# Patient Record
Sex: FEMALE | Race: OTHER | Employment: UNEMPLOYED | URBAN - NONMETROPOLITAN AREA
[De-identification: names, ages, dates, MRNs, and addresses within clinical notes are randomized per-mention and may not be internally consistent; named-entity substitution may affect disease eponyms.]

---

## 2019-12-26 ENCOUNTER — HOSPITAL ENCOUNTER (EMERGENCY)
Age: 34
Discharge: HOME | End: 2019-12-26
Payer: MEDICARE

## 2019-12-26 VITALS
DIASTOLIC BLOOD PRESSURE: 81 MMHG | OXYGEN SATURATION: 100 % | HEART RATE: 82 BPM | RESPIRATION RATE: 18 BRPM | TEMPERATURE: 97 F | SYSTOLIC BLOOD PRESSURE: 120 MMHG

## 2019-12-26 DIAGNOSIS — L02.612 ABSCESS OF LEFT FOOT: Primary | ICD-10-CM

## 2019-12-26 PROCEDURE — 87070 CULTURE OTHR SPECIMN AEROBIC: CPT

## 2019-12-26 PROCEDURE — 87205 SMEAR GRAM STAIN: CPT

## 2019-12-26 PROCEDURE — 87186 SC STD MICRODIL/AGAR DIL: CPT

## 2019-12-26 PROCEDURE — 99283 EMERGENCY DEPT VISIT LOW MDM: CPT

## 2019-12-26 PROCEDURE — 86403 PARTICLE AGGLUT ANTBDY SCRN: CPT

## 2019-12-26 RX ORDER — CLINDAMYCIN HYDROCHLORIDE 300 MG/1
300 CAPSULE ORAL 3 TIMES DAILY
Qty: 21 CAPSULE | Refills: 0 | Status: SHIPPED | OUTPATIENT
Start: 2019-12-26 | End: 2020-01-02

## 2019-12-26 RX ORDER — LIDOCAINE HYDROCHLORIDE 10 MG/ML
20 INJECTION, SOLUTION INFILTRATION; PERINEURAL ONCE
Status: DISCONTINUED | OUTPATIENT
Start: 2019-12-26 | End: 2019-12-26 | Stop reason: HOSPADM

## 2019-12-26 ASSESSMENT — PAIN DESCRIPTION - ORIENTATION: ORIENTATION: LEFT

## 2019-12-26 ASSESSMENT — PAIN DESCRIPTION - LOCATION: LOCATION: FOOT

## 2019-12-26 ASSESSMENT — PAIN SCALES - GENERAL: PAINLEVEL_OUTOF10: 4

## 2019-12-29 LAB
CULTURE: ABNORMAL
DIRECT EXAM: ABNORMAL
DIRECT EXAM: ABNORMAL
Lab: ABNORMAL
SPECIMEN DESCRIPTION: ABNORMAL

## 2020-09-15 ENCOUNTER — TELEPHONE (OUTPATIENT)
Dept: OBGYN CLINIC | Age: 35
End: 2020-09-15

## 2020-09-15 NOTE — TELEPHONE ENCOUNTER
Patient had labs done 9/11/2020 at [facility]. Please call her with the following information:    Appears to have iron deficiency anemia with low vitamin b12 - should consider seeing heme for consult and we can facilitate this. Her insulin level is improved, A1C slightly higher than previous year - see PCP and forward results for this. Can supplement the B12 in our office if she desires - let me know and I can send script for in office wkly injections. Lab results are on your desk to scan.   Thanks

## 2020-09-21 NOTE — TELEPHONE ENCOUNTER
Called patient and reviewed results. She is agreeable to heme referral. PCP is Sharon Haq so we can forward results. She is also agreeable to the B12 injections, need to send to pharmacy and call her back for appts for that.

## 2020-09-22 RX ORDER — CYANOCOBALAMIN 1000 UG/ML
1000 INJECTION INTRAMUSCULAR; SUBCUTANEOUS WEEKLY
Qty: 4 ML | Refills: 1 | Status: SHIPPED | OUTPATIENT
Start: 2020-09-22 | End: 2020-11-18

## 2020-09-23 NOTE — TELEPHONE ENCOUNTER
Called patient and let her know referral was sent and confirmation was received. Rx at her pharmacy and made all 8 appts for her injections in the office.

## 2020-09-28 ENCOUNTER — NURSE ONLY (OUTPATIENT)
Dept: OBGYN CLINIC | Age: 35
End: 2020-09-28
Payer: MEDICARE

## 2020-09-28 PROCEDURE — 96372 THER/PROPH/DIAG INJ SC/IM: CPT | Performed by: ADVANCED PRACTICE MIDWIFE

## 2020-09-28 RX ORDER — CYANOCOBALAMIN 1000 UG/ML
1000 INJECTION INTRAMUSCULAR; SUBCUTANEOUS ONCE
Status: COMPLETED | OUTPATIENT
Start: 2020-09-28 | End: 2020-09-28

## 2020-09-28 RX ADMIN — CYANOCOBALAMIN 1000 MCG: 1000 INJECTION INTRAMUSCULAR; SUBCUTANEOUS at 15:11

## 2020-10-05 ENCOUNTER — NURSE ONLY (OUTPATIENT)
Dept: OBGYN CLINIC | Age: 35
End: 2020-10-05
Payer: MEDICARE

## 2020-10-05 PROCEDURE — 96372 THER/PROPH/DIAG INJ SC/IM: CPT | Performed by: ADVANCED PRACTICE MIDWIFE

## 2020-10-05 RX ORDER — CYANOCOBALAMIN 1000 UG/ML
1000 INJECTION INTRAMUSCULAR; SUBCUTANEOUS ONCE
Status: COMPLETED | OUTPATIENT
Start: 2020-10-05 | End: 2020-10-05

## 2020-10-05 RX ADMIN — CYANOCOBALAMIN 1000 MCG: 1000 INJECTION INTRAMUSCULAR; SUBCUTANEOUS at 15:04

## 2020-10-12 RX ORDER — TIZANIDINE 2 MG/1
2 TABLET ORAL EVERY 6 HOURS PRN
COMMUNITY

## 2020-10-12 RX ORDER — OMEPRAZOLE 10 MG/1
10 CAPSULE, DELAYED RELEASE ORAL DAILY
COMMUNITY

## 2020-10-12 RX ORDER — IBUPROFEN AND FAMOTIDINE 26.6; 8 MG/1; MG/1
TABLET, FILM COATED ORAL
COMMUNITY

## 2020-10-12 RX ORDER — FINGOLIMOD HCL 0.25 MG/1
CAPSULE ORAL
COMMUNITY

## 2020-10-12 RX ORDER — ERGOCALCIFEROL 1.25 MG/1
50000 CAPSULE ORAL WEEKLY
COMMUNITY

## 2020-10-12 RX ORDER — OXYCODONE AND ACETAMINOPHEN 10; 325 MG/1; MG/1
1 TABLET ORAL EVERY 4 HOURS PRN
COMMUNITY

## 2020-10-12 RX ORDER — DOCUSATE SODIUM 100 MG/1
100 CAPSULE, LIQUID FILLED ORAL 2 TIMES DAILY
COMMUNITY

## 2020-10-12 SDOH — HEALTH STABILITY: MENTAL HEALTH: HOW OFTEN DO YOU HAVE A DRINK CONTAINING ALCOHOL?: NEVER

## 2020-10-15 ENCOUNTER — NURSE ONLY (OUTPATIENT)
Dept: OBGYN CLINIC | Age: 35
End: 2020-10-15
Payer: MEDICARE

## 2020-10-15 PROCEDURE — 96372 THER/PROPH/DIAG INJ SC/IM: CPT | Performed by: ADVANCED PRACTICE MIDWIFE

## 2020-10-15 PROCEDURE — 99211 OFF/OP EST MAY X REQ PHY/QHP: CPT | Performed by: OBSTETRICS & GYNECOLOGY

## 2020-10-15 RX ORDER — CYANOCOBALAMIN 1000 UG/ML
1000 INJECTION INTRAMUSCULAR; SUBCUTANEOUS ONCE
Status: COMPLETED | OUTPATIENT
Start: 2020-10-15 | End: 2020-10-15

## 2020-10-15 RX ADMIN — CYANOCOBALAMIN 1000 MCG: 1000 INJECTION INTRAMUSCULAR; SUBCUTANEOUS at 15:28

## 2020-10-19 ENCOUNTER — NURSE ONLY (OUTPATIENT)
Dept: OBGYN CLINIC | Age: 35
End: 2020-10-19
Payer: MEDICARE

## 2020-10-19 PROCEDURE — 96372 THER/PROPH/DIAG INJ SC/IM: CPT | Performed by: ADVANCED PRACTICE MIDWIFE

## 2020-10-19 RX ORDER — CYANOCOBALAMIN 1000 UG/ML
1000 INJECTION INTRAMUSCULAR; SUBCUTANEOUS ONCE
Status: COMPLETED | OUTPATIENT
Start: 2020-10-19 | End: 2020-10-19

## 2020-10-19 RX ADMIN — CYANOCOBALAMIN 1000 MCG: 1000 INJECTION INTRAMUSCULAR; SUBCUTANEOUS at 15:11

## 2020-10-27 ENCOUNTER — NURSE ONLY (OUTPATIENT)
Dept: OBGYN CLINIC | Age: 35
End: 2020-10-27
Payer: MEDICARE

## 2020-10-27 PROCEDURE — 96372 THER/PROPH/DIAG INJ SC/IM: CPT | Performed by: ADVANCED PRACTICE MIDWIFE

## 2020-10-27 RX ORDER — CYANOCOBALAMIN 1000 UG/ML
1000 INJECTION INTRAMUSCULAR; SUBCUTANEOUS ONCE
Status: COMPLETED | OUTPATIENT
Start: 2020-10-27 | End: 2020-10-27

## 2020-10-27 RX ADMIN — CYANOCOBALAMIN 1000 MCG: 1000 INJECTION INTRAMUSCULAR; SUBCUTANEOUS at 14:54

## 2020-11-02 ENCOUNTER — NURSE ONLY (OUTPATIENT)
Dept: OBGYN CLINIC | Age: 35
End: 2020-11-02
Payer: MEDICARE

## 2020-11-02 PROCEDURE — 96372 THER/PROPH/DIAG INJ SC/IM: CPT | Performed by: ADVANCED PRACTICE MIDWIFE

## 2020-11-02 PROCEDURE — 99211 OFF/OP EST MAY X REQ PHY/QHP: CPT | Performed by: ADVANCED PRACTICE MIDWIFE

## 2020-11-02 RX ORDER — CYANOCOBALAMIN 1000 UG/ML
1000 INJECTION INTRAMUSCULAR; SUBCUTANEOUS ONCE
Status: COMPLETED | OUTPATIENT
Start: 2020-11-02 | End: 2020-11-02

## 2020-11-02 RX ADMIN — CYANOCOBALAMIN 1000 MCG: 1000 INJECTION INTRAMUSCULAR; SUBCUTANEOUS at 15:34

## 2020-11-09 ENCOUNTER — NURSE ONLY (OUTPATIENT)
Dept: OBGYN CLINIC | Age: 35
End: 2020-11-09
Payer: MEDICARE

## 2020-11-09 PROCEDURE — 99211 OFF/OP EST MAY X REQ PHY/QHP: CPT | Performed by: OBSTETRICS & GYNECOLOGY

## 2020-11-09 PROCEDURE — 96372 THER/PROPH/DIAG INJ SC/IM: CPT | Performed by: ADVANCED PRACTICE MIDWIFE

## 2020-11-09 RX ORDER — CYANOCOBALAMIN 1000 UG/ML
1000 INJECTION INTRAMUSCULAR; SUBCUTANEOUS ONCE
Status: COMPLETED | OUTPATIENT
Start: 2020-11-09 | End: 2020-11-09

## 2020-11-09 RX ADMIN — CYANOCOBALAMIN 1000 MCG: 1000 INJECTION INTRAMUSCULAR; SUBCUTANEOUS at 14:58

## 2020-11-16 ENCOUNTER — NURSE ONLY (OUTPATIENT)
Dept: OBGYN CLINIC | Age: 35
End: 2020-11-16
Payer: MEDICARE

## 2020-11-16 PROCEDURE — 96372 THER/PROPH/DIAG INJ SC/IM: CPT | Performed by: ADVANCED PRACTICE MIDWIFE

## 2020-11-16 RX ORDER — CYANOCOBALAMIN 1000 UG/ML
1000 INJECTION INTRAMUSCULAR; SUBCUTANEOUS ONCE
Status: COMPLETED | OUTPATIENT
Start: 2020-11-16 | End: 2020-11-16

## 2020-11-16 RX ADMIN — CYANOCOBALAMIN 1000 MCG: 1000 INJECTION INTRAMUSCULAR; SUBCUTANEOUS at 14:55

## 2020-11-18 RX ORDER — CYANOCOBALAMIN 1000 UG/ML
INJECTION INTRAMUSCULAR; SUBCUTANEOUS
Qty: 4 ML | Refills: 1 | Status: SHIPPED | OUTPATIENT
Start: 2020-11-18 | End: 2021-11-04 | Stop reason: ALTCHOICE

## 2021-10-27 ENCOUNTER — TELEPHONE (OUTPATIENT)
Dept: OBGYN CLINIC | Age: 36
End: 2021-10-27

## 2021-10-27 NOTE — TELEPHONE ENCOUNTER
Received automated refill request for patient metformin from [facility] - overdue for an annual     Has an appt scheduled for 11/4/21    Please call her and see if would like this Metformin refilled at this time - if so okay to send a one month supply - per their records taking 1000mg daily

## 2021-11-04 ENCOUNTER — HOSPITAL ENCOUNTER (OUTPATIENT)
Age: 36
Setting detail: SPECIMEN
Discharge: HOME | End: 2021-11-04
Payer: MEDICARE

## 2021-11-04 ENCOUNTER — OFFICE VISIT (OUTPATIENT)
Dept: OBGYN CLINIC | Age: 36
End: 2021-11-04
Payer: MEDICARE

## 2021-11-04 VITALS
BODY MASS INDEX: 33.36 KG/M2 | SYSTOLIC BLOOD PRESSURE: 110 MMHG | HEIGHT: 64 IN | WEIGHT: 195.4 LBS | DIASTOLIC BLOOD PRESSURE: 60 MMHG

## 2021-11-04 DIAGNOSIS — E74.818 OTHER DISORDERS OF GLUCOSE TRANSPORT (HCC): ICD-10-CM

## 2021-11-04 DIAGNOSIS — Z12.72 SMEAR, VAGINAL, AS PART OF ROUTINE GYNECOLOGICAL EXAMINATION: Primary | ICD-10-CM

## 2021-11-04 DIAGNOSIS — E88.81 INSULIN RESISTANCE: ICD-10-CM

## 2021-11-04 DIAGNOSIS — Z01.419 SMEAR, VAGINAL, AS PART OF ROUTINE GYNECOLOGICAL EXAMINATION: Primary | ICD-10-CM

## 2021-11-04 LAB
ALBUMIN SERPL-MCNC: 4.7 G/DL (ref 3.5–5.2)
ALBUMIN/GLOBULIN RATIO: 1.7 (ref 1–2.5)
ALP BLD-CCNC: 74 U/L (ref 35–104)
ALT SERPL-CCNC: 18 U/L (ref 5–33)
ANION GAP SERPL CALCULATED.3IONS-SCNC: 13 MMOL/L (ref 9–17)
AST SERPL-CCNC: 19 U/L
BILIRUB SERPL-MCNC: 0.26 MG/DL (ref 0.3–1.2)
BUN BLDV-MCNC: 8 MG/DL (ref 6–20)
BUN/CREAT BLD: ABNORMAL (ref 9–20)
CALCIUM SERPL-MCNC: 9.6 MG/DL (ref 8.6–10.4)
CHLORIDE BLD-SCNC: 103 MMOL/L (ref 98–107)
CO2: 22 MMOL/L (ref 20–31)
CREAT SERPL-MCNC: 0.78 MG/DL (ref 0.5–0.9)
ESTIMATED AVERAGE GLUCOSE: 91 MG/DL
GFR AFRICAN AMERICAN: >60 ML/MIN
GFR NON-AFRICAN AMERICAN: >60 ML/MIN
GFR SERPL CREATININE-BSD FRML MDRD: ABNORMAL ML/MIN/{1.73_M2}
GFR SERPL CREATININE-BSD FRML MDRD: ABNORMAL ML/MIN/{1.73_M2}
GLUCOSE BLD-MCNC: 88 MG/DL (ref 70–99)
HBA1C MFR BLD: 4.8 % (ref 4–6)
POTASSIUM SERPL-SCNC: 4.7 MMOL/L (ref 3.7–5.3)
SODIUM BLD-SCNC: 138 MMOL/L (ref 135–144)
TOTAL PROTEIN: 7.5 G/DL (ref 6.4–8.3)
VITAMIN B-12: 288 PG/ML (ref 232–1245)

## 2021-11-04 PROCEDURE — G8417 CALC BMI ABV UP PARAM F/U: HCPCS | Performed by: ADVANCED PRACTICE MIDWIFE

## 2021-11-04 PROCEDURE — G0101 CA SCREEN;PELVIC/BREAST EXAM: HCPCS | Performed by: ADVANCED PRACTICE MIDWIFE

## 2021-11-04 PROCEDURE — G8427 DOCREV CUR MEDS BY ELIG CLIN: HCPCS | Performed by: ADVANCED PRACTICE MIDWIFE

## 2021-11-04 PROCEDURE — 36415 COLL VENOUS BLD VENIPUNCTURE: CPT | Performed by: ADVANCED PRACTICE MIDWIFE

## 2021-11-04 RX ORDER — LORATADINE 10 MG/1
10 CAPSULE, LIQUID FILLED ORAL
COMMUNITY

## 2021-11-04 RX ORDER — TRAZODONE HYDROCHLORIDE 100 MG/1
TABLET ORAL
COMMUNITY
Start: 2021-09-18

## 2021-11-04 RX ORDER — ONDANSETRON 4 MG/1
4 TABLET, FILM COATED ORAL EVERY 8 HOURS PRN
COMMUNITY

## 2021-11-04 RX ORDER — CLOBETASOL PROPIONATE 0.5 MG/G
OINTMENT TOPICAL
COMMUNITY
Start: 2021-10-06

## 2021-11-04 RX ORDER — METHYLPHENIDATE HYDROCHLORIDE 10 MG/1
TABLET ORAL
COMMUNITY
Start: 2021-10-04

## 2021-11-04 ASSESSMENT — ENCOUNTER SYMPTOMS
DIARRHEA: 0
ABDOMINAL PAIN: 0
SHORTNESS OF BREATH: 0
GASTROINTESTINAL NEGATIVE: 1
RESPIRATORY NEGATIVE: 1
CONSTIPATION: 0

## 2021-11-04 NOTE — PROGRESS NOTES
YEARLY PHYSICAL    Date of service: 2021    Cheryl Flores  Is a 29 y.o.   female    PT's PCP is: Kirstie Bird MD     : 1985                                             Subjective:       Patient's last menstrual period was 10/20/2021 (exact date). Are your menses regular: yes    OB History    Para Term  AB Living   2 2 2     2   SAB TAB Ectopic Molar Multiple Live Births             2      # Outcome Date GA Lbr Siddhartha/2nd Weight Sex Delivery Anes PTL Lv   2 Term 18 40w0d  8 lb 8 oz (3.856 kg) F  EPI  HUNG   1 Term 10/17/10 40w0d  8 lb 6 oz (3.799 kg) M Vag-Spont EPI N HUNG        Social History     Tobacco Use   Smoking Status Never Smoker   Smokeless Tobacco Never Used        Social History     Substance and Sexual Activity   Alcohol Use Never       Family History   Problem Relation Age of Onset    Stomach Cancer Maternal Grandmother     Diabetes Maternal Grandmother     Hypertension Mother     Ovarian Cancer Maternal Aunt 29       Any family history of breast or ovarian cancer: Yes    Any family history of blood clots: No      Allergies: Patient has no known allergies.       Current Outpatient Medications:     ALBUTEROL IN, Inhale into the lungs, Disp: , Rfl:     traZODone (DESYREL) 100 MG tablet, take 1 tablet by mouth at bedtime, Disp: , Rfl:     ondansetron (ZOFRAN) 4 MG tablet, Take 4 mg by mouth every 8 hours as needed, Disp: , Rfl:     methylphenidate (RITALIN) 10 MG tablet, take 1 tablet by mouth twice a day, Disp: , Rfl:     loratadine (CLARITIN) 10 MG capsule, Take 10 tablets by mouth, Disp: , Rfl:     clobetasol (TEMOVATE) 0.05 % ointment, apply topically to affected area twice a day for 2 weeks then if needed for itching / SCALE, Disp: , Rfl:     metFORMIN (GLUCOPHAGE) 500 MG tablet, Take 1 tablet by mouth 2 times daily (with meals), Disp: 60 tablet, Rfl: 0    tiZANidine (ZANAFLEX) 2 MG tablet, Take 2 mg by mouth every 6 hours as needed, Disp: , Rfl:     Fingolimod HCl (GILENYA) 0.25 MG CAPS, Take by mouth, Disp: , Rfl:     oxyCODONE-acetaminophen (PERCOCET)  MG per tablet, Take 1 tablet by mouth every 4 hours as needed for Pain., Disp: , Rfl:     omeprazole (PRILOSEC) 10 MG delayed release capsule, Take 10 mg by mouth daily, Disp: , Rfl:     docusate sodium (COLACE) 100 MG capsule, Take 100 mg by mouth 2 times daily, Disp: , Rfl:     vitamin D (ERGOCALCIFEROL) 1.25 MG (89182 UT) CAPS capsule, Take 50,000 Units by mouth once a week, Disp: , Rfl:     ibuprofen-famotidine 800-26.6 MG TABS, Take by mouth, Disp: , Rfl:     Social History     Substance and Sexual Activity   Sexual Activity Yes    Partners: Male       Any bleeding or pain with intercourse: No    Last Yearly:  2020    Last pap: 2019 - normal    Last HPV: 2019 - negative    Do you do self breast exams: Encouraged    Past Medical History:   Diagnosis Date    Anxiety     Asthma     Chlamydia     Chronic back pain     Depression     Insulin resistance     Multiple sclerosis ([facility] [address]     Muscular dystrophy ([facility] [address]     Polycystic ovary        Past Surgical History:   Procedure Laterality Date    STERILIZATION  2018       Family History   Problem Relation Age of Onset    Stomach Cancer Maternal Grandmother     Diabetes Maternal Grandmother     Hypertension Mother     Ovarian Cancer Maternal Aunt 29       Chief Complaint   Patient presents with    Annual Exam     Annual exam. Pap NL 4/4/19. Pt would like refill of metformin. Denies breast/pelvic pain. Denies concerns. Labs:    No results found for this visit on 11/04/21. HPI: Denies breast/pelvic concerns. Menses regular. Pap normal 2019. Desires refill of Metformin. Declines STI screening    Review of Systems   Constitutional: Negative. Negative for chills, fatigue and fever. HENT: Negative. Respiratory: Negative. Negative for shortness of breath. Cardiovascular: Negative. Negative for chest pain. Gastrointestinal: Negative. Negative for abdominal pain, constipation and diarrhea. Genitourinary: Negative for dysuria, enuresis, frequency, menstrual problem, pelvic pain, urgency and vaginal bleeding. Musculoskeletal: Negative. Neurological: Negative. Negative for dizziness, light-headedness and headaches. Psychiatric/Behavioral: Negative. Objective  Blood pressure 110/60, height 5' 4\" (1.626 m), weight 195 lb 6.4 oz (88.6 kg), last menstrual period 10/20/2021. Physical Exam  Constitutional:       Appearance: Normal appearance. She is normal weight. Genitourinary:      Pelvic exam was performed with patient in the lithotomy position. Vulva, urethra, vagina, cervix, uterus, right adnexa and left adnexa normal.      Uterus is anteverted and regular. HENT:      Head: Normocephalic. Eyes:      Pupils: Pupils are equal, round, and reactive to light. Cardiovascular:      Rate and Rhythm: Normal rate and regular rhythm. Pulses: Normal pulses. Heart sounds: Normal heart sounds. No murmur heard. Pulmonary:      Effort: Pulmonary effort is normal.      Breath sounds: Normal breath sounds. No wheezing. Chest:      Breasts:         Right: Normal.         Left: Normal.   Abdominal:      Palpations: Abdomen is soft. Tenderness: There is no abdominal tenderness. Musculoskeletal:         General: Normal range of motion. Cervical back: Normal range of motion. No muscular tenderness. Neurological:      Mental Status: She is alert and oriented to person, place, and time. Skin:     General: Skin is warm and dry. Psychiatric:         Behavior: Behavior normal.   Vitals and nursing note reviewed. Chaperone present: Declined. Assessment and Plan          Diagnosis Orders   1. Smear, vaginal, as part of routine gynecological examination     2.  Insulin resistance  Comprehensive Metabolic Panel    Hemoglobin A1C   3. Other disorders of glucose transport ([facility] [address]   Hemoglobin A1C        Repeat Annual every 1 year  Cervical Cytology Evaluation begins at 24years old. If Negative Cytology, Follow-up screening per current guidelines. Mammograms every 1year. If 37 yo and last mammogram was negative. Calcium and Vitamin D dosing reviewed. Colonoscopy screening reviewed as well as onset for bone density testing. Birth control and barrier recommendationsdiscussed. STD counseling and prevention reviewed. Gardisil counseling completed for all patients. Routine healthmaintenance per patients PCP. Will obtain CMP and A1C today in addition to repeat vit b check - will send in metformin - taking 1000mg BID      I have discontinued Anna Wiseman's cyanocobalamin. I am also having her maintain her tiZANidine, Gilenya, oxyCODONE-acetaminophen, omeprazole, docusate sodium, vitamin D, ibuprofen-famotidine, metFORMIN, traZODone, ondansetron, methylphenidate, loratadine, clobetasol, and ALBUTEROL IN. Return in about 1 year (around 11/4/2022) for Yearly. She was also counseled on her preventative health maintenance recommendations and follow-up. There are no Patient Instructions on file for this visit.     AMOL Frey CNM,11/4/2021 11:17 AM

## 2022-10-14 NOTE — PROGRESS NOTES
Chaperone for Intimate Exam   Chaperone was offered as part of the rooming process. Patient declined and agrees to continue with exam without a chaperone. Vaccine Information Statement(s) or the Emergency Use Authorization was given today. This has been reviewed, questions answered, and verbal consent given by Patient for injection(s) and administration of Influenza (Inactivated).      Patient tolerated without incident. See immunization grid for documentation.

## 2022-10-31 ENCOUNTER — TELEPHONE (OUTPATIENT)
Dept: OBGYN CLINIC | Age: 37
End: 2022-10-31

## 2022-10-31 DIAGNOSIS — N92.6 IRREGULAR MENSES: Primary | ICD-10-CM

## 2022-10-31 NOTE — TELEPHONE ENCOUNTER
Pt was scheduled for a procedure at [facility] through another physician, however a UPT was done and it was positive then 2 more negative. Pt did have a TL with Dr Jacob Feng. They cancelled her procedure until a SPT is ordered with a negative result. She would like to do that today at her primary physician office if possible.  Please advise

## 2022-11-02 DIAGNOSIS — N92.6 IRREGULAR MENSES: ICD-10-CM

## 2022-11-02 NOTE — TELEPHONE ENCOUNTER
Results of SPT lab sent to Dr. Ranjit Trejo, the physician who cxl'd procedure at [facility]. Confirmation rec'd.

## 2022-11-21 NOTE — TELEPHONE ENCOUNTER
Patient left a message on the voicemail that she was needing a refill of her metformin. I attempted to call her back and had to leave a message. Explained that we responded to the electronic request from her pharmacy and refills were sent in. Patient to call with any further questions/concerns.

## 2023-01-17 ENCOUNTER — HOSPITAL ENCOUNTER (OUTPATIENT)
Age: 38
Setting detail: SPECIMEN
Discharge: HOME | End: 2023-01-17

## 2023-01-17 ENCOUNTER — OFFICE VISIT (OUTPATIENT)
Dept: OBGYN CLINIC | Age: 38
End: 2023-01-17
Payer: MEDICARE

## 2023-01-17 VITALS
HEIGHT: 64 IN | WEIGHT: 196.2 LBS | SYSTOLIC BLOOD PRESSURE: 110 MMHG | DIASTOLIC BLOOD PRESSURE: 70 MMHG | BODY MASS INDEX: 33.49 KG/M2

## 2023-01-17 DIAGNOSIS — Z12.4 SCREENING FOR CERVICAL CANCER: ICD-10-CM

## 2023-01-17 DIAGNOSIS — E88.81 METABOLIC SYNDROME: ICD-10-CM

## 2023-01-17 DIAGNOSIS — Z01.419 SMEAR, VAGINAL, AS PART OF ROUTINE GYNECOLOGICAL EXAMINATION: Primary | ICD-10-CM

## 2023-01-17 DIAGNOSIS — Z12.72 SMEAR, VAGINAL, AS PART OF ROUTINE GYNECOLOGICAL EXAMINATION: Primary | ICD-10-CM

## 2023-01-17 LAB
ALBUMIN SERPL-MCNC: 4 G/DL (ref 3.5–5.2)
ALBUMIN/GLOBULIN RATIO: 1.8 (ref 1–2.5)
ALP BLD-CCNC: 65 U/L (ref 35–104)
ALT SERPL-CCNC: 18 U/L (ref 5–33)
ANION GAP SERPL CALCULATED.3IONS-SCNC: 12 MMOL/L (ref 9–17)
AST SERPL-CCNC: 20 U/L
BILIRUB SERPL-MCNC: 0.3 MG/DL (ref 0.3–1.2)
BUN BLDV-MCNC: 9 MG/DL (ref 6–20)
CALCIUM SERPL-MCNC: 8.9 MG/DL (ref 8.6–10.4)
CHLORIDE BLD-SCNC: 101 MMOL/L (ref 98–107)
CO2: 21 MMOL/L (ref 20–31)
CREAT SERPL-MCNC: 0.71 MG/DL (ref 0.5–0.9)
GFR SERPL CREATININE-BSD FRML MDRD: >60 ML/MIN/1.73M2
GLUCOSE BLD-MCNC: 94 MG/DL (ref 70–99)
POTASSIUM SERPL-SCNC: 4.5 MMOL/L (ref 3.7–5.3)
SODIUM BLD-SCNC: 134 MMOL/L (ref 135–144)
TOTAL PROTEIN: 6.2 G/DL (ref 6.4–8.3)

## 2023-01-17 PROCEDURE — 36415 COLL VENOUS BLD VENIPUNCTURE: CPT | Performed by: ADVANCED PRACTICE MIDWIFE

## 2023-01-17 PROCEDURE — G0101 CA SCREEN;PELVIC/BREAST EXAM: HCPCS | Performed by: ADVANCED PRACTICE MIDWIFE

## 2023-01-17 RX ORDER — HALOBETASOL PROPIONATE 0.05 %
OINTMENT (GRAM) TOPICAL
COMMUNITY
Start: 2022-12-16

## 2023-01-17 RX ORDER — TIZANIDINE 4 MG/1
TABLET ORAL
COMMUNITY
Start: 2023-01-09

## 2023-01-17 RX ORDER — FINGOLIMOD HYDROCHLORIDE 0.5 MG/1
CAPSULE ORAL
COMMUNITY
Start: 2022-12-23

## 2023-01-17 RX ORDER — OMEPRAZOLE 40 MG/1
CAPSULE, DELAYED RELEASE ORAL
COMMUNITY
Start: 2022-11-14 | End: 2023-01-17 | Stop reason: ALTCHOICE

## 2023-01-17 RX ORDER — IPRATROPIUM BROMIDE 42 UG/1
SPRAY, METERED NASAL
COMMUNITY
Start: 2022-11-14

## 2023-01-17 RX ORDER — CHOLECALCIFEROL (VITAMIN D3) 50 MCG
TABLET ORAL
COMMUNITY
Start: 2023-01-04

## 2023-01-17 RX ORDER — ONDANSETRON HYDROCHLORIDE 8 MG/1
TABLET, FILM COATED ORAL
COMMUNITY
Start: 2022-12-30

## 2023-01-17 ASSESSMENT — ENCOUNTER SYMPTOMS
RESPIRATORY NEGATIVE: 1
SHORTNESS OF BREATH: 0
DIARRHEA: 0
CONSTIPATION: 0
GASTROINTESTINAL NEGATIVE: 1
ABDOMINAL PAIN: 0

## 2023-01-17 NOTE — PROGRESS NOTES
YEARLY PHYSICAL    Date of service: 2023    Anaya Salmeron  Is a 40 y.o.   female    PT's PCP is: Jerry Vaca MD     : 1985                                             Subjective:       Patient's last menstrual period was 2023 (exact date). Are your menses regular: yes    OB History    Para Term  AB Living   2 2 2     2   SAB IAB Ectopic Molar Multiple Live Births             2      # Outcome Date GA Lbr Siddhartha/2nd Weight Sex Delivery Anes PTL Lv   2 Term 18 40w0d  8 lb 8 oz (3.856 kg) F  EPI  HUNG   1 Term 10/17/10 40w0d  8 lb 6 oz (3.799 kg) M Vag-Spont EPI N HUNG        Social History     Tobacco Use   Smoking Status Never   Smokeless Tobacco Never        Social History     Substance and Sexual Activity   Alcohol Use Never       Family History   Problem Relation Age of Onset    Stomach Cancer Maternal Grandmother     Diabetes Maternal Grandmother     Hypertension Mother     Ovarian Cancer Maternal Aunt 58 [facility]       Any family history of breast or ovarian cancer: Yes    Any family history of blood clots: No      Allergies: Patient has no known allergies. Current Outpatient Medications:     Fingolimod HCl 0.5 MG CAPS, , Disp: , Rfl:     tiZANidine (ZANAFLEX) 4 MG tablet, take 1 tablet by mouth three times a day, Disp: , Rfl:     ondansetron (ZOFRAN) 8 MG tablet, take 1 tablet by mouth three times a day if needed for nausea, Disp: , Rfl:     omeprazole (PRILOSEC) 40 MG delayed release capsule, take 1 capsule by mouth once daily, Disp: , Rfl:     ipratropium (ATROVENT) 0.06 % nasal spray, instill 2 sprays into each nostril four times a day if needed for. ..  (REFER TO PRESCRIPTION NOTES). , Disp: , Rfl:     halobetasol (ULTRAVATE) 0.05 % ointment, apply A THIN LAYER topically to affected area twice a day AND COVER, Disp: , Rfl:     vitamin D (CHOLECALCIFEROL) 50 MCG (2000) TABS tablet, take 1 tablet by mouth once daily, Disp: , Rfl:     metFORMIN (GLUCOPHAGE) 1000 MG tablet, take 1 tablet by mouth twice a day with meals, Disp: 60 tablet, Rfl: 0    traZODone (DESYREL) 100 MG tablet, take 1 tablet by mouth at bedtime, Disp: , Rfl:     ondansetron (ZOFRAN) 4 MG tablet, Take 4 mg by mouth every 8 hours as needed, Disp: , Rfl:     methylphenidate (RITALIN) 10 MG tablet, take 1 tablet by mouth twice a day, Disp: , Rfl:     loratadine (CLARITIN) 10 MG capsule, Take 10 tablets by mouth, Disp: , Rfl:     clobetasol (TEMOVATE) 0.05 % ointment, apply topically to affected area twice a day for 2 weeks then if needed for itching / SCALE, Disp: , Rfl:     ALBUTEROL IN, Inhale into the lungs, Disp: , Rfl:     tiZANidine (ZANAFLEX) 2 MG tablet, Take 2 mg by mouth every 6 hours as needed, Disp: , Rfl:     oxyCODONE-acetaminophen (PERCOCET)  MG per tablet, Take 1 tablet by mouth every 4 hours as needed for Pain., Disp: , Rfl:     omeprazole (PRILOSEC) 10 MG delayed release capsule, Take 10 mg by mouth daily, Disp: , Rfl:     docusate sodium (COLACE) 100 MG capsule, Take 100 mg by mouth 2 times daily, Disp: , Rfl:     vitamin D (ERGOCALCIFEROL) 1.25 MG (50463 UT) CAPS capsule, Take 50,000 Units by mouth once a week, Disp: , Rfl:     ibuprofen-famotidine 800-26.6 MG TABS, Take by mouth, Disp: , Rfl:     Social History     Substance and Sexual Activity   Sexual Activity Yes    Partners: Male       Any bleeding or pain with intercourse: No    Last Yearly:  2021    Last pap: 2019 - normal    Last HPV: 2019 - negative    Do you do self breast exams: Encouraged    Past Medical History:   Diagnosis Date    Anxiety     Asthma     Chlamydia     Chronic back pain     Depression     Insulin resistance     Multiple sclerosis ([facility] [address]     Muscular dystrophy ([facility] [address]     Polycystic ovary        Past Surgical History:   Procedure Laterality Date    STERILIZATION  2018       Family History   Problem Relation Age of Onset    Stomach Cancer Maternal Grandmother     Diabetes Maternal Grandmother     Hypertension Mother     Ovarian Cancer Maternal Aunt 29       Chief Complaint   Patient presents with    Annual Exam     Pap due. Pt denies concenrs. Desires refill of metformin. Labs:    No results found for this visit on 01/17/23. HPI:  Annual.  Denies breast/pelvic concerns. Due for pap smear. D eclines Sti screening. Desires refill of metformin. Reports fatigue - neuro recently started Ritalin for this - has f/u scheduled \"just not helping much yet\". Review of Systems   Constitutional:  Positive for fatigue. Negative for chills and fever. HENT: Negative. Respiratory: Negative. Negative for shortness of breath. Cardiovascular: Negative. Negative for chest pain. Gastrointestinal: Negative. Negative for abdominal pain, constipation and diarrhea. Genitourinary:  Negative for dysuria, enuresis, frequency, menstrual problem, pelvic pain, urgency and vaginal bleeding. Musculoskeletal: Negative. Neurological: Negative. Negative for dizziness, light-headedness and headaches. Psychiatric/Behavioral: Negative. Objective  Blood pressure 110/70, height 5' 4\" (1.626 m), weight 196 lb 3.2 oz (89 kg), last menstrual period 01/04/2023. Physical Exam  Constitutional:       Appearance: Normal appearance. She is obese. Genitourinary:      Vulva, bladder and urethral meatus normal.      No vaginal discharge or tenderness. No vaginal prolapse present. Right Adnexa: not tender. Left Adnexa: not tender. No cervical motion tenderness or discharge. Uterus is not tender. Pelvic exam was performed with patient in the lithotomy position. Breasts:     Breasts are symmetrical.      Breasts are soft. Right: No mass, nipple discharge, skin change or tenderness. Left: No mass, nipple discharge, skin change or tenderness. HENT:      Head: Normocephalic.    Eyes:      Pupils: Pupils are equal, round, and reactive to light. Cardiovascular:      Rate and Rhythm: Normal rate and regular rhythm. Pulses: Normal pulses. Heart sounds: Normal heart sounds. No murmur heard. Pulmonary:      Effort: Pulmonary effort is normal.      Breath sounds: Normal breath sounds. No wheezing. Abdominal:      Palpations: Abdomen is soft. Tenderness: There is no abdominal tenderness. Musculoskeletal:         General: Normal range of motion. Cervical back: Normal range of motion. No muscular tenderness. Neurological:      Mental Status: She is alert and oriented to person, place, and time. Skin:     General: Skin is warm and dry. Psychiatric:         Behavior: Behavior normal.   Vitals and nursing note reviewed. Chaperone present: Declined. Assessment and Plan          Diagnosis Orders   1. Smear, vaginal, as part of routine gynecological examination        2. Screening for cervical cancer  PAP SMEAR          Repeat Annual every 1 year  Cervical Cytology Evaluation begins at 24years old. If Negative Cytology, Follow-up screening per current guidelines. Mammograms every 1year. If 37 yo and last mammogram was negative. Calcium and Vitamin D dosing reviewed. Birth control and barrier recommendationsdiscussed. STD counseling and prevention reviewed. Routine healthmaintenance per patients PCP. CMP today for refill of metformin        I am having Anna Wiseman maintain her tiZANidine, oxyCODONE-acetaminophen, omeprazole, docusate sodium, vitamin D, ibuprofen-famotidine, traZODone, ondansetron, methylphenidate, loratadine, clobetasol, ALBUTEROL IN, Fingolimod HCl, tiZANidine, ondansetron, omeprazole, ipratropium, halobetasol, vitamin D, and metFORMIN. Return in about 1 year (around 1/17/2024) for Med Follow Up, Yearly. She was also counseled on her preventative health maintenance recommendations and follow-up.      There are no Patient Instructions on file for this visit.    Hasmukh Castrejon, AMOL - JAMARI,1/17/2023 11:39 AM

## 2023-01-20 LAB
HPV SAMPLE: NORMAL
HPV, GENOTYPE 16: NOT DETECTED
HPV, GENOTYPE 18: NOT DETECTED
HPV, HIGH RISK OTHER: NOT DETECTED
HPV, INTERPRETATION: NORMAL
SPECIMEN DESCRIPTION: NORMAL

## 2023-02-13 ENCOUNTER — OFFICE VISIT (OUTPATIENT)
Dept: OBGYN CLINIC | Age: 38
End: 2023-02-13
Payer: MEDICARE

## 2023-02-13 VITALS — WEIGHT: 200 LBS | SYSTOLIC BLOOD PRESSURE: 108 MMHG | DIASTOLIC BLOOD PRESSURE: 78 MMHG | BODY MASS INDEX: 34.33 KG/M2

## 2023-02-13 DIAGNOSIS — N76.0 ABSCESS, VAGINA: Primary | ICD-10-CM

## 2023-02-13 PROCEDURE — 99214 OFFICE O/P EST MOD 30 MIN: CPT | Performed by: ADVANCED PRACTICE MIDWIFE

## 2023-02-13 PROCEDURE — G8484 FLU IMMUNIZE NO ADMIN: HCPCS | Performed by: ADVANCED PRACTICE MIDWIFE

## 2023-02-13 PROCEDURE — G8427 DOCREV CUR MEDS BY ELIG CLIN: HCPCS | Performed by: ADVANCED PRACTICE MIDWIFE

## 2023-02-13 PROCEDURE — 1036F TOBACCO NON-USER: CPT | Performed by: ADVANCED PRACTICE MIDWIFE

## 2023-02-13 PROCEDURE — G8417 CALC BMI ABV UP PARAM F/U: HCPCS | Performed by: ADVANCED PRACTICE MIDWIFE

## 2023-02-13 RX ORDER — CEPHALEXIN 500 MG/1
500 CAPSULE ORAL 2 TIMES DAILY
Qty: 14 CAPSULE | Refills: 0 | Status: SHIPPED | OUTPATIENT
Start: 2023-02-13 | End: 2023-02-20

## 2023-02-13 RX ORDER — IBUPROFEN 800 MG/1
TABLET ORAL
COMMUNITY
Start: 2023-02-01

## 2023-02-13 ASSESSMENT — ENCOUNTER SYMPTOMS
GASTROINTESTINAL NEGATIVE: 1
RESPIRATORY NEGATIVE: 1

## 2023-02-13 NOTE — PROGRESS NOTES
PROBLEM VISIT     Date of service: 2023    Mariela Chase  Is a 40 y.o.  female    PT's PCP is: Walter Baker MD     : 1985                                          HPI Here for evaluation of vaginal lesion. Reports last week had area on right side that was painful and Thursday \"the worst\" - size was larger, painful. Was taking warm showers and over weekend area did begin to leak clear/white fluid followed by blood tinged fluid. Reports that now area is still sore but quite a bit smaller than was previously. Review of Systems   Constitutional: Negative. HENT: Negative. Respiratory: Negative. Gastrointestinal: Negative. Genitourinary:  Positive for genital sores. Negative for menstrual problem. Neurological: Negative. Psychiatric/Behavioral: Negative. Patient's last menstrual period was 2023 (exact date). OB History    Para Term  AB Living   2 2 2     2   SAB IAB Ectopic Molar Multiple Live Births             2      # Outcome Date GA Lbr Siddhartha/2nd Weight Sex Delivery Anes PTL Lv   2 Term 18 40w0d  8 lb 8 oz (3.856 kg) F  EPI  HUNG   1 Term 10/17/10 40w0d  8 lb 6 oz (3.799 kg) M Vag-Spont EPI N HUNG        Social History     Tobacco Use   Smoking Status Never   Smokeless Tobacco Never        Social History     Substance and Sexual Activity   Alcohol Use Never       Allergies: Patient has no known allergies.       Current Outpatient Medications:     ibuprofen (ADVIL;MOTRIN) 800 MG tablet, take 1 tablet by mouth three times a day AS NEEDED FOR PAIN, Disp: , Rfl:     cephALEXin (KEFLEX) 500 MG capsule, Take 1 capsule by mouth 2 times daily for 7 days, Disp: 14 capsule, Rfl: 0    Fingolimod HCl 0.5 MG CAPS, , Disp: , Rfl:     tiZANidine (ZANAFLEX) 4 MG tablet, take 1 tablet by mouth three times a day, Disp: , Rfl:     ondansetron (ZOFRAN) 8 MG tablet, take 1 tablet by mouth three times a day if needed for nausea, Disp: , Rfl:     ipratropium (ATROVENT) 0.06 % nasal spray, instill 2 sprays into each nostril four times a day if needed for. ..  (REFER TO PRESCRIPTION NOTES). , Disp: , Rfl:     halobetasol (ULTRAVATE) 0.05 % ointment, apply A THIN LAYER topically to affected area twice a day AND COVER, Disp: , Rfl:     vitamin D (CHOLECALCIFEROL) 50 MCG (2000 UT) TABS tablet, take 1 tablet by mouth once daily, Disp: , Rfl:     metFORMIN (GLUCOPHAGE) 1000 MG tablet, take 1 tablet by mouth twice a day with meals, Disp: 60 tablet, Rfl: 12    traZODone (DESYREL) 100 MG tablet, take 1 tablet by mouth at bedtime, Disp: , Rfl:     methylphenidate (RITALIN) 10 MG tablet, take 1 tablet by mouth twice a day, Disp: , Rfl:     loratadine (CLARITIN) 10 MG capsule, Take 10 tablets by mouth, Disp: , Rfl:     clobetasol (TEMOVATE) 0.05 % ointment, apply topically to affected area twice a day for 2 weeks then if needed for itching / SCALE, Disp: , Rfl:     ALBUTEROL IN, Inhale into the lungs, Disp: , Rfl:     oxyCODONE-acetaminophen (PERCOCET)  MG per tablet, Take 1 tablet by mouth every 4 hours as needed for Pain., Disp: , Rfl:     omeprazole (PRILOSEC) 10 MG delayed release capsule, Take 10 mg by mouth daily, Disp: , Rfl:     docusate sodium (COLACE) 100 MG capsule, Take 100 mg by mouth 2 times daily, Disp: , Rfl:     Social History     Substance and Sexual Activity   Sexual Activity Yes    Partners: Male       Chief Complaint   Patient presents with    Other     Patient reports painful vaginal lump about the size of the end of her thumb on right side of labia which she first noticed about a week ago. She took a hot shower over the weekend and felt like the area drained and it is a little bit better now. Physical Exam  Constitutional:       Appearance: Normal appearance. She is obese. Genitourinary:         HENT:      Head: Normocephalic. Eyes:      Pupils: Pupils are equal, round, and reactive to light.    Pulmonary:      Effort: Pulmonary effort is normal.   Musculoskeletal:         General: Normal range of motion. Cervical back: Normal range of motion. Neurological:      Mental Status: She is alert and oriented to person, place, and time. Skin:     General: Skin is warm and dry. Psychiatric:         Behavior: Behavior normal.   Vitals and nursing note reviewed. Vital Signs  Blood pressure 108/78, weight 200 lb (90.7 kg), last menstrual period 02/02/2023. Assessment and Plan          Diagnosis Orders   1. Abscess, vagina            Discussed will treat with abx for any possible residual infection as area is still slightly tender and firm. Discussed warm moist compress/sitz bath concept. Discussed s/s to call our office for that would indicate need for I&D. She was agreeable. I am having Anna Wiseman start on cephALEXin. I am also having her maintain her oxyCODONE-acetaminophen, omeprazole, docusate sodium, traZODone, methylphenidate, loratadine, clobetasol, ALBUTEROL IN, Fingolimod HCl, tiZANidine, ondansetron, ipratropium, halobetasol, vitamin D, metFORMIN, and ibuprofen. No follow-ups on file. She was also counseled on her preventative health maintenance recommendations and follow-up. There are no Patient Instructions on file for this visit.     AMOL Ham CNM,2/13/2023 12:36 PM                     Electronically signed by AMOL Ham CNM

## 2023-03-22 ENCOUNTER — TELEPHONE (OUTPATIENT)
Dept: OBGYN CLINIC | Age: 38
End: 2023-03-22

## 2023-08-31 ENCOUNTER — HOSPITAL ENCOUNTER (OUTPATIENT)
Age: 38
Setting detail: SPECIMEN
Discharge: HOME | End: 2023-08-31

## 2023-08-31 ENCOUNTER — OFFICE VISIT (OUTPATIENT)
Dept: OBGYN CLINIC | Age: 38
End: 2023-08-31
Payer: MEDICARE

## 2023-08-31 VITALS
HEIGHT: 64 IN | BODY MASS INDEX: 28.17 KG/M2 | SYSTOLIC BLOOD PRESSURE: 110 MMHG | DIASTOLIC BLOOD PRESSURE: 74 MMHG | WEIGHT: 165 LBS

## 2023-08-31 DIAGNOSIS — R53.83 DECREASED ENERGY: ICD-10-CM

## 2023-08-31 DIAGNOSIS — L65.9 HAIR THINNING: Primary | ICD-10-CM

## 2023-08-31 DIAGNOSIS — L65.9 HAIR THINNING: ICD-10-CM

## 2023-08-31 DIAGNOSIS — R94.6 ABNORMAL THYROID EXAM: ICD-10-CM

## 2023-08-31 DIAGNOSIS — R53.83 OTHER FATIGUE: ICD-10-CM

## 2023-08-31 DIAGNOSIS — E55.9 VITAMIN D DEFICIENCY, UNSPECIFIED: ICD-10-CM

## 2023-08-31 DIAGNOSIS — Z13.29 SCREENING FOR ENDOCRINE DISORDER: ICD-10-CM

## 2023-08-31 DIAGNOSIS — R23.3 BRUISES EASILY: ICD-10-CM

## 2023-08-31 LAB
25(OH)D3 SERPL-MCNC: 57.9 NG/ML
BASOPHILS # BLD: 0 K/UL (ref 0–0.2)
BASOPHILS NFR BLD: 0 % (ref 0–2)
EOSINOPHIL # BLD: 0.17 K/UL (ref 0–0.4)
EOSINOPHILS RELATIVE PERCENT: 4 % (ref 1–4)
ERYTHROCYTE [DISTWIDTH] IN BLOOD BY AUTOMATED COUNT: 13.4 % (ref 11.8–14.4)
HCT VFR BLD AUTO: 38.3 % (ref 36.3–47.1)
HGB BLD-MCNC: 12.3 G/DL (ref 11.9–15.1)
IMM GRANULOCYTES # BLD AUTO: 0 K/UL (ref 0–0.3)
IMM GRANULOCYTES NFR BLD: 0 %
IRON SATN MFR SERPL: 18 % (ref 20–55)
IRON SERPL-MCNC: 57 UG/DL (ref 37–145)
LYMPHOCYTES NFR BLD: 0.34 K/UL (ref 1–4.8)
LYMPHOCYTES RELATIVE PERCENT: 8 % (ref 24–44)
MCH RBC QN AUTO: 27.9 PG (ref 25.2–33.5)
MCHC RBC AUTO-ENTMCNC: 32.1 G/DL (ref 28.4–34.8)
MCV RBC AUTO: 86.8 FL (ref 82.6–102.9)
MONOCYTES NFR BLD: 0.52 K/UL (ref 0.1–0.8)
MONOCYTES NFR BLD: 12 % (ref 1–7)
MORPHOLOGY: NORMAL
NEUTROPHILS NFR BLD: 76 % (ref 36–66)
NEUTS SEG NFR BLD: 3.27 K/UL (ref 1.8–7.7)
NRBC BLD-RTO: 0 PER 100 WBC
PLATELET # BLD AUTO: 275 K/UL (ref 138–453)
PMV BLD AUTO: 12.3 FL (ref 8.1–13.5)
RBC # BLD AUTO: 4.41 M/UL (ref 3.95–5.11)
T3FREE SERPL-MCNC: 2.41 PG/ML (ref 2.02–4.43)
T4 FREE SERPL-MCNC: 1.8 NG/DL (ref 0.9–1.7)
TIBC SERPL-MCNC: 323 UG/DL (ref 250–450)
TSH SERPL DL<=0.05 MIU/L-ACNC: 0.58 UIU/ML (ref 0.3–5)
UNSATURATED IRON BINDING CAPACITY: 266 UG/DL (ref 112–347)
VIT B12 SERPL-MCNC: 632 PG/ML (ref 232–1245)
WBC OTHER # BLD: 4.3 K/UL (ref 3.5–11.3)

## 2023-08-31 PROCEDURE — 1036F TOBACCO NON-USER: CPT | Performed by: ADVANCED PRACTICE MIDWIFE

## 2023-08-31 PROCEDURE — G8417 CALC BMI ABV UP PARAM F/U: HCPCS | Performed by: ADVANCED PRACTICE MIDWIFE

## 2023-08-31 PROCEDURE — G8427 DOCREV CUR MEDS BY ELIG CLIN: HCPCS | Performed by: ADVANCED PRACTICE MIDWIFE

## 2023-08-31 PROCEDURE — 99213 OFFICE O/P EST LOW 20 MIN: CPT | Performed by: ADVANCED PRACTICE MIDWIFE

## 2023-08-31 RX ORDER — OMEPRAZOLE 40 MG/1
40 CAPSULE, DELAYED RELEASE ORAL DAILY
COMMUNITY
Start: 2023-08-08 | End: 2023-08-31 | Stop reason: ALTCHOICE

## 2023-08-31 ASSESSMENT — ENCOUNTER SYMPTOMS
RESPIRATORY NEGATIVE: 1
GASTROINTESTINAL NEGATIVE: 1

## 2023-08-31 NOTE — PROGRESS NOTES
Normocephalic. Eyes:      Pupils: Pupils are equal, round, and reactive to light. Neck:      Comments: Prominent left thyroid  Cardiovascular:      Rate and Rhythm: Normal rate and regular rhythm. Pulses: Normal pulses. Heart sounds: Normal heart sounds. No murmur heard. Pulmonary:      Effort: Pulmonary effort is normal.      Breath sounds: Normal breath sounds. No wheezing. Musculoskeletal:         General: Normal range of motion. Cervical back: Normal range of motion. Neurological:      Mental Status: She is alert and oriented to person, place, and time. Skin:     General: Skin is warm and dry. Comments: Thinning hair with scant coverage along sides of head and crown   Psychiatric:         Behavior: Behavior normal.   Vitals and nursing note reviewed. Vital Signs  Blood pressure 110/74, height 5' 4\" (1.626 m), weight 165 lb (74.8 kg), last menstrual period 08/06/2023. Assessment and Plan          Diagnosis Orders   1. Hair thinning  CBC with Auto Differential    Thyroid Antibodies    T3, Free    T4, Free    TSH    Vitamin D 25 Hydroxy    Iron and TIBC    Vitamin B12      2. Decreased energy  CBC with Auto Differential    Thyroid Antibodies    T3, Free    T4, Free    TSH    Vitamin D 25 Hydroxy    Iron and TIBC    Vitamin B12      3. Bruises easily  CBC with Auto Differential    Thyroid Antibodies    T3, Free    T4, Free    TSH    Vitamin D 25 Hydroxy    Iron and TIBC    Vitamin B12      4. Screening for endocrine disorder  Thyroid Antibodies    T3, Free    T4, Free    TSH      5. Abnormal thyroid exam  Thyroid Antibodies    T3, Free    T4, Free    TSH      6. Other fatigue  Vitamin D 25 Hydroxy      7. Vitamin D deficiency, unspecified  Vitamin D 25 Hydroxy          Encouraged to continue good nutrition, sleep habits, and exercise with walking  Discussed basic lab work up - see PCP if abnormal or if necessary can refer to endo or derm or heme.

## 2023-09-02 LAB
THYROGLOBULIN AB: <12 IU/ML (ref 0–40)
THYROPEROXIDASE AB SERPL IA-ACNC: <4 IU/ML (ref 0–25)

## 2023-09-06 DIAGNOSIS — R53.83 DECREASED ENERGY: Primary | ICD-10-CM

## 2023-09-06 DIAGNOSIS — R53.83 OTHER FATIGUE: ICD-10-CM

## 2023-09-06 DIAGNOSIS — R23.3 BRUISES EASILY: ICD-10-CM

## 2023-09-13 ENCOUNTER — HOSPITAL ENCOUNTER (OUTPATIENT)
Dept: ULTRASOUND IMAGING | Age: 38
Discharge: HOME | End: 2023-09-15
Payer: MEDICARE

## 2023-09-13 DIAGNOSIS — R94.6 ABNORMAL THYROID EXAM: ICD-10-CM

## 2023-09-13 DIAGNOSIS — L65.9 HAIR THINNING: ICD-10-CM

## 2023-09-13 PROCEDURE — 76536 US EXAM OF HEAD AND NECK: CPT

## 2024-01-25 ENCOUNTER — OFFICE VISIT (OUTPATIENT)
Dept: OBGYN CLINIC | Age: 39
End: 2024-01-25

## 2024-01-25 VITALS
HEIGHT: 64 IN | DIASTOLIC BLOOD PRESSURE: 78 MMHG | SYSTOLIC BLOOD PRESSURE: 112 MMHG | WEIGHT: 160.6 LBS | BODY MASS INDEX: 27.42 KG/M2

## 2024-01-25 DIAGNOSIS — Z76.0 MEDICATION REFILL: Primary | ICD-10-CM

## 2024-01-25 DIAGNOSIS — Z12.72 SMEAR, VAGINAL, AS PART OF ROUTINE GYNECOLOGICAL EXAMINATION: ICD-10-CM

## 2024-01-25 DIAGNOSIS — Z01.419 SMEAR, VAGINAL, AS PART OF ROUTINE GYNECOLOGICAL EXAMINATION: ICD-10-CM

## 2024-01-25 ASSESSMENT — ENCOUNTER SYMPTOMS
CONSTIPATION: 0
DIARRHEA: 0
ABDOMINAL PAIN: 0
GASTROINTESTINAL NEGATIVE: 1

## 2024-01-25 NOTE — PROGRESS NOTES
Chaperone for Intimate Exam  Chaperone was offered as part of the rooming process. Patient declined and agrees to continue with exam without a chaperone.       
Annual.  Denies breast/pelvic concerns. Menses regular.  Pap normal/negative in 2023. Monogamous relationship.  Declines Sti screening.  Gets rash in umbilicus with exercise - thinks yeast - planning to see PCP for this  Desires refill of metformin - doing well with 1000mg PO daily.     Review of Systems   Constitutional: Negative.  Negative for chills, fatigue and fever.   HENT: Negative.     Respiratory: Negative.  Negative for shortness of breath.    Cardiovascular: Negative.  Negative for chest pain.   Gastrointestinal: Negative.  Negative for abdominal pain, constipation and diarrhea.   Genitourinary:  Negative for dysuria, enuresis, frequency, menstrual problem, pelvic pain, urgency and vaginal bleeding.   Musculoskeletal: Negative.    Neurological: Negative.  Negative for dizziness, light-headedness and headaches.   Psychiatric/Behavioral: Negative.           Objective  Blood pressure 112/78, height 1.626 m (5' 4\"), weight 72.8 kg (160 lb 9.6 oz), last menstrual period 01/06/2024.  Physical Exam  Constitutional:       Appearance: Normal appearance. She is normal weight.   Genitourinary:      Vulva, bladder and urethral meatus normal.      No vaginal discharge or tenderness.      No vaginal prolapse present.       Right Adnexa: not tender.     Left Adnexa: not tender.     No cervical motion tenderness or discharge.      Uterus is not tender.      Pelvic exam was performed with patient in the lithotomy position.   Breasts:     Breasts are symmetrical.      Breasts are soft.     Right: No mass, nipple discharge, skin change or tenderness.      Left: No mass, nipple discharge, skin change or tenderness.   HENT:      Head: Normocephalic.   Eyes:      Pupils: Pupils are equal, round, and reactive to light.   Cardiovascular:      Rate and Rhythm: Normal rate and regular rhythm.      Pulses: Normal pulses.      Heart sounds: Normal heart sounds. No murmur heard.  Pulmonary:      Effort: Pulmonary effort is normal.

## 2024-02-14 ENCOUNTER — TELEPHONE (OUTPATIENT)
Dept: OBGYN CLINIC | Age: 39
End: 2024-02-14

## 2024-02-14 NOTE — TELEPHONE ENCOUNTER
Pt called stating that her metformin script was sent incorrectly. She has been taking 1,000 mg BID and the script was for 1,000 mg daily. Pt wondering if a new script could be sent.

## 2024-02-15 NOTE — TELEPHONE ENCOUNTER
Okay so last script was sent for 1000mg daily and in the past yes was doing 1000mg PO BID.  I will send new script now that we did deeper digging.   Next time she has wellness labs please have her send us a copy

## 2024-02-15 NOTE — TELEPHONE ENCOUNTER
It is the same dose - weird that this happened because I always use the little [place] refill button to do a refill  So does she want it to be 2-500mg tablets so she can break up the dosing?  If so can you just call [facility] and tell them to swap it to 2-500mg tablets daily instead of the 1000mg or will they make me send a new script

## 2024-08-21 ENCOUNTER — HOSPITAL ENCOUNTER (OUTPATIENT)
Age: 39
Setting detail: SPECIMEN
Discharge: HOME | End: 2024-08-21

## 2024-08-21 ENCOUNTER — OFFICE VISIT (OUTPATIENT)
Dept: OBGYN CLINIC | Age: 39
End: 2024-08-21
Payer: MEDICARE

## 2024-08-21 VITALS — BODY MASS INDEX: 26.06 KG/M2 | DIASTOLIC BLOOD PRESSURE: 86 MMHG | WEIGHT: 151.8 LBS | SYSTOLIC BLOOD PRESSURE: 130 MMHG

## 2024-08-21 DIAGNOSIS — N89.8 VAGINAL DISCHARGE: ICD-10-CM

## 2024-08-21 DIAGNOSIS — R30.0 DYSURIA: ICD-10-CM

## 2024-08-21 DIAGNOSIS — N90.7 VULVAR CYST: ICD-10-CM

## 2024-08-21 DIAGNOSIS — R30.0 DYSURIA: Primary | ICD-10-CM

## 2024-08-21 LAB
BILIRUBIN, POC: NORMAL
BLOOD URINE, POC: NORMAL
CLARITY, POC: CLEAR
COLOR, POC: YELLOW
GLUCOSE URINE, POC: NORMAL
KETONES, POC: NORMAL
LEUKOCYTE EST, POC: NORMAL
NITRITE, POC: NORMAL
PH, POC: 6.5
PROTEIN, POC: NORMAL
SPECIFIC GRAVITY, POC: 1.01
UROBILINOGEN, POC: 0.2

## 2024-08-21 PROCEDURE — 81003 URINALYSIS AUTO W/O SCOPE: CPT

## 2024-08-21 PROCEDURE — 99214 OFFICE O/P EST MOD 30 MIN: CPT

## 2024-08-21 PROCEDURE — 1036F TOBACCO NON-USER: CPT

## 2024-08-21 PROCEDURE — G8417 CALC BMI ABV UP PARAM F/U: HCPCS

## 2024-08-21 PROCEDURE — G8427 DOCREV CUR MEDS BY ELIG CLIN: HCPCS

## 2024-08-21 PROCEDURE — 56405 I&D VULVA/PERINEAL ABSCESS: CPT

## 2024-08-21 RX ORDER — LIDOCAINE HYDROCHLORIDE AND EPINEPHRINE BITARTRATE 20; .01 MG/ML; MG/ML
1.7 INJECTION, SOLUTION SUBCUTANEOUS ONCE
Status: COMPLETED | OUTPATIENT
Start: 2024-08-21 | End: 2024-08-21

## 2024-08-21 RX ADMIN — LIDOCAINE HYDROCHLORIDE AND EPINEPHRINE BITARTRATE 1.7 ML: 20; .01 INJECTION, SOLUTION SUBCUTANEOUS at 13:27

## 2024-08-21 ASSESSMENT — ENCOUNTER SYMPTOMS
COUGH: 0
SHORTNESS OF BREATH: 0
WHEEZING: 0
CONSTIPATION: 0
ABDOMINAL DISTENTION: 0
VOMITING: 0
COLOR CHANGE: 0
DIARRHEA: 0
NAUSEA: 0
ABDOMINAL PAIN: 0
CHEST TIGHTNESS: 0

## 2024-08-21 NOTE — PROGRESS NOTES
EXAM:  Constitutional:   Blood pressure 130/86, weight 68.9 kg (151 lb 12.8 oz), last menstrual period 08/06/2024.  Wt Readings from Last 3 Encounters:   08/21/24 68.9 kg (151 lb 12.8 oz)   01/25/24 72.8 kg (160 lb 9.6 oz)   08/31/23 74.8 kg (165 lb)       Physical Exam  Constitutional:       General: She is not in acute distress.     Appearance: Normal appearance. She is not toxic-appearing or diaphoretic.   Genitourinary:      Right Labia: No rash, tenderness or lesions.     Left Labia: No tenderness, lesions or rash.        HENT:      Head: Normocephalic.      Mouth/Throat:      Mouth: Mucous membranes are moist.      Pharynx: Oropharynx is clear.   Cardiovascular:      Rate and Rhythm: Normal rate and regular rhythm.      Heart sounds: Normal heart sounds.   Pulmonary:      Effort: Pulmonary effort is normal. No respiratory distress.      Breath sounds: Normal breath sounds. No wheezing.   Abdominal:      General: Abdomen is flat.   Musculoskeletal:         General: Normal range of motion.      Cervical back: Normal range of motion. No rigidity or tenderness.   Lymphadenopathy:      Cervical: No cervical adenopathy.   Neurological:      General: No focal deficit present.      Mental Status: She is alert and oriented to person, place, and time. Mental status is at baseline.   Skin:     General: Skin is warm and dry.      Capillary Refill: Capillary refill takes less than 2 seconds.   Psychiatric:         Mood and Affect: Mood normal.         Behavior: Behavior normal.         Thought Content: Thought content normal.         Judgment: Judgment normal.         Procedure:   Patient positioned in stir ups. Visualization of right vulvar cyst noted and palpated noted to be approximately 1cm in diameter. Patient reports tenderness to palpation. Cleansed with betadine. Then using sterile technique 2ml of lidocaine was injected just under the skin for local anesthesia. Then a small incision was made. A small amount of

## 2024-08-22 LAB
C TRACH DNA SPEC QL PROBE+SIG AMP: NEGATIVE
CANDIDA SPECIES: POSITIVE
GARDNERELLA VAGINALIS: NEGATIVE
N GONORRHOEA DNA SPEC QL PROBE+SIG AMP: NEGATIVE
SOURCE: ABNORMAL
SPECIMEN DESCRIPTION: NORMAL
TRICHOMONAS: NEGATIVE

## 2024-08-22 RX ORDER — FLUCONAZOLE 150 MG/1
150 TABLET ORAL ONCE
Qty: 1 TABLET | Refills: 0 | Status: SHIPPED | OUTPATIENT
Start: 2024-08-22 | End: 2024-08-22

## 2024-08-23 LAB
MICROORGANISM SPEC CULT: ABNORMAL
SERVICE CMNT-IMP: ABNORMAL
SPECIMEN DESCRIPTION: ABNORMAL

## 2024-08-23 RX ORDER — SULFAMETHOXAZOLE AND TRIMETHOPRIM 800; 160 MG/1; MG/1
1 TABLET ORAL 2 TIMES DAILY
Qty: 10 TABLET | Refills: 0 | Status: SHIPPED | OUTPATIENT
Start: 2024-08-23 | End: 2024-08-28

## 2024-08-23 RX ORDER — NITROFURANTOIN 25; 75 MG/1; MG/1
100 CAPSULE ORAL 2 TIMES DAILY
Qty: 10 CAPSULE | Refills: 0 | Status: SHIPPED | OUTPATIENT
Start: 2024-08-23 | End: 2024-08-28

## 2024-08-24 LAB
MICROORGANISM SPEC CULT: NORMAL
MICROORGANISM/AGENT SPEC: NORMAL
MICROORGANISM/AGENT SPEC: NORMAL
SPECIMEN DESCRIPTION: NORMAL

## 2025-04-07 ASSESSMENT — ENCOUNTER SYMPTOMS
RESPIRATORY NEGATIVE: 1
GASTROINTESTINAL NEGATIVE: 1

## 2025-04-09 ENCOUNTER — OFFICE VISIT (OUTPATIENT)
Dept: OBGYN CLINIC | Age: 40
End: 2025-04-09
Payer: MEDICARE

## 2025-04-09 ENCOUNTER — TELEPHONE (OUTPATIENT)
Dept: OBGYN CLINIC | Age: 40
End: 2025-04-09

## 2025-04-09 VITALS
DIASTOLIC BLOOD PRESSURE: 80 MMHG | BODY MASS INDEX: 24.59 KG/M2 | WEIGHT: 144 LBS | SYSTOLIC BLOOD PRESSURE: 112 MMHG | HEIGHT: 64 IN

## 2025-04-09 DIAGNOSIS — Z76.0 MEDICATION REFILL: Primary | ICD-10-CM

## 2025-04-09 PROCEDURE — G8427 DOCREV CUR MEDS BY ELIG CLIN: HCPCS | Performed by: ADVANCED PRACTICE MIDWIFE

## 2025-04-09 PROCEDURE — 99213 OFFICE O/P EST LOW 20 MIN: CPT | Performed by: ADVANCED PRACTICE MIDWIFE

## 2025-04-09 PROCEDURE — 1036F TOBACCO NON-USER: CPT | Performed by: ADVANCED PRACTICE MIDWIFE

## 2025-04-09 PROCEDURE — G8420 CALC BMI NORM PARAMETERS: HCPCS | Performed by: ADVANCED PRACTICE MIDWIFE

## 2025-04-09 SDOH — ECONOMIC STABILITY: FOOD INSECURITY: WITHIN THE PAST 12 MONTHS, YOU WORRIED THAT YOUR FOOD WOULD RUN OUT BEFORE YOU GOT MONEY TO BUY MORE.: NEVER TRUE

## 2025-04-09 SDOH — ECONOMIC STABILITY: FOOD INSECURITY: WITHIN THE PAST 12 MONTHS, THE FOOD YOU BOUGHT JUST DIDN'T LAST AND YOU DIDN'T HAVE MONEY TO GET MORE.: NEVER TRUE

## 2025-04-09 NOTE — PROGRESS NOTES
Chaperone for Intimate Exam  Chaperone was offered as part of the rooming process. Patient declined and agrees to continue with exam without a chaperone.       
needed for nausea, Disp: , Rfl:     ipratropium (ATROVENT) 0.06 % nasal spray, instill 2 sprays into each nostril four times a day if needed for...  (REFER TO PRESCRIPTION NOTES)., Disp: , Rfl:     vitamin D (CHOLECALCIFEROL) 50 MCG (2000 UT) TABS tablet, take 1 tablet by mouth once daily, Disp: , Rfl:     traZODone (DESYREL) 100 MG tablet, take 1 tablet by mouth at bedtime, Disp: , Rfl:     methylphenidate (RITALIN) 10 MG tablet, take 1 tablet by mouth twice a day, Disp: , Rfl:     clobetasol (TEMOVATE) 0.05 % ointment, apply topically to affected area twice a day for 2 weeks then if needed for itching / SCALE, Disp: , Rfl:     ALBUTEROL IN, Inhale into the lungs, Disp: , Rfl:     oxyCODONE-acetaminophen (PERCOCET)  MG per tablet, Take 1 tablet by mouth every 4 hours as needed for Pain., Disp: , Rfl:     omeprazole (PRILOSEC) 10 MG delayed release capsule, Take 1 capsule by mouth daily, Disp: , Rfl:     docusate sodium (COLACE) 100 MG capsule, Take 1 capsule by mouth 2 times daily, Disp: , Rfl:     halobetasol (ULTRAVATE) 0.05 % ointment, apply A THIN LAYER topically to affected area twice a day AND COVER (Patient not taking: Reported on 4/9/2025), Disp: , Rfl:     loratadine (CLARITIN) 10 MG capsule, Take 10 tablets by mouth (Patient not taking: Reported on 4/9/2025), Disp: , Rfl:     Social History     Substance and Sexual Activity   Sexual Activity Yes    Partners: Male       Chief Complaint   Patient presents with    Annual Exam     Pap NL 1/17/23. Denies concerns.        Physical Exam  Constitutional:       Appearance: Normal appearance. She is obese.   HENT:      Head: Normocephalic.   Eyes:      Pupils: Pupils are equal, round, and reactive to light.   Cardiovascular:      Rate and Rhythm: Normal rate and regular rhythm.      Pulses: Normal pulses.      Heart sounds: Normal heart sounds. No murmur heard.  Pulmonary:      Effort: Pulmonary effort is normal.      Breath sounds: Normal breath sounds. No

## 2025-04-09 NOTE — TELEPHONE ENCOUNTER
Can you please call [facility] -   Reports has labs done today and recently also with heme so should have CBC and CMP for me to review  In addition reported that had mammogram last year - ordered by PCP - and was to have repeat at 6 months due to possible adenoma or lymph node and did not have this done.  Please get this report as well.

## 2025-04-10 NOTE — TELEPHONE ENCOUNTER
Reviewed mammogram done 4/17/2024 - at that time mammogram showed \"9mm oval mass in left breast\" and ultrasound was recommended.  USN was done and recommended ultrasound guided biopsy which I believe was not done per patinet report  I suggest she reach back out to her PCP - Dr Mullins who ordered original imaging and inquire about repeat imaging since has been a year to ensure stability